# Patient Record
Sex: FEMALE | Race: BLACK OR AFRICAN AMERICAN | NOT HISPANIC OR LATINO | URBAN - METROPOLITAN AREA
[De-identification: names, ages, dates, MRNs, and addresses within clinical notes are randomized per-mention and may not be internally consistent; named-entity substitution may affect disease eponyms.]

---

## 2023-12-07 ENCOUNTER — EMERGENCY (EMERGENCY)
Facility: HOSPITAL | Age: 58
LOS: 1 days | Discharge: HOME | End: 2023-12-07
Attending: EMERGENCY MEDICINE | Admitting: EMERGENCY MEDICINE
Payer: COMMERCIAL

## 2023-12-07 VITALS
DIASTOLIC BLOOD PRESSURE: 100 MMHG | OXYGEN SATURATION: 100 % | TEMPERATURE: 98 F | RESPIRATION RATE: 18 BRPM | SYSTOLIC BLOOD PRESSURE: 156 MMHG | HEART RATE: 65 BPM

## 2023-12-07 VITALS
RESPIRATION RATE: 17 BRPM | HEART RATE: 77 BPM | DIASTOLIC BLOOD PRESSURE: 94 MMHG | TEMPERATURE: 98 F | OXYGEN SATURATION: 100 % | SYSTOLIC BLOOD PRESSURE: 165 MMHG

## 2023-12-07 PROCEDURE — 99284 EMERGENCY DEPT VISIT MOD MDM: CPT

## 2023-12-07 PROCEDURE — 93971 EXTREMITY STUDY: CPT | Mod: 26,LT

## 2023-12-07 RX ORDER — RIVAROXABAN 15 MG-20MG
1 KIT ORAL
Qty: 42 | Refills: 0
Start: 2023-12-07 | End: 2023-12-27

## 2023-12-07 RX ORDER — IBUPROFEN 200 MG
600 TABLET ORAL ONCE
Refills: 0 | Status: COMPLETED | OUTPATIENT
Start: 2023-12-07 | End: 2023-12-07

## 2023-12-07 RX ORDER — RIVAROXABAN 15 MG-20MG
15 KIT ORAL ONCE
Refills: 0 | Status: COMPLETED | OUTPATIENT
Start: 2023-12-07 | End: 2023-12-07

## 2023-12-07 RX ADMIN — Medication 600 MILLIGRAM(S): at 12:52

## 2023-12-07 RX ADMIN — Medication 600 MILLIGRAM(S): at 12:22

## 2023-12-07 RX ADMIN — RIVAROXABAN 15 MILLIGRAM(S): KIT at 15:01

## 2023-12-07 NOTE — CHART NOTE - NSCHARTNOTEFT_GEN_A_CORE
KAMERON alerted by MD Parker that pt is ready for DC back to her shelter. Pt's medications will be ready at 4pm therefore provider requesting 4:30pm pickup. KAMERON met with pt who was unable to recall name of shelter, however, confirmed address on facesheet.     KAMERON arranged trip through [employer] portal (Inv# [id number]). Trip assigned to [employer] (p:[phone]) for 4:30pm pickup. SW confirmed pt's contact information.     No other SW needs identified at this time. KAMERON alerted by MD Parker that pt is ready for DC back to her shelter. Pt's medications will be ready at 4pm therefore provider requesting 4:30pm pickup. KAMERON met with pt who was unable to recall name of shelter, however, confirmed address on facesheet.     KAMERON arranged trip through [employer] portal (Inv# [id number]). Trip assigned to [employer] (p:[phone]) for 4:30pm pickup. SW confirmed pt's contact information.     No other SW needs identified at this time.

## 2023-12-07 NOTE — ED PROVIDER NOTE - NSFOLLOWUPINSTRUCTIONS_ED_ALL_ED_FT
YOU WERE SEEN IN THE ER FOR LEG PAIN AND YOU WERE FOUND TO HAVE A BLOOD CLOT IN YOUR LEG.    A PRESCRIPTION FOR BLOOD THINNER WAS SENT TO THE PHARMACY ([facility]).  THIS PRESCRIPTION IS FOR THE FIRST 21 DAYS OF TREATMENT, BUT IT IS EXTREMELY IMPORTANT THAT YOU SEE YOUR PRIMARY DOCTOR BEFORE 21 DAYS IS UP, BECAUSE YOU WILL NEED FURTHER PRESCRIPTIONS FOR THIS BLOOD THINNER.  YOU WILL NEED TO TAKE THIS MEDICATION FOR 3 MONTHS TOTAL, AND IT IS IMPORTANT THAT YOU NOT SKIP ANY DAYS.    PLEASE START TO TAKE YOUR BLOOD PRESSURE MEDICATION AGAIN EVERY DAY.    PLEASE RETURN TO THE ER FOR ANY WORSENING SYMPTOMS OR CONCERNS, INCLUDING (BUT NOT LIMITED TO): CHEST PAIN, SHORTNESS OF BREATH, WEAKNESS, OR WORSENING PAIN/SWELLING IN YOUR LEG.    THANK YOU FOR COMING TO [facility]. YOU WERE SEEN IN THE ER FOR LEG PAIN AND YOU WERE FOUND TO HAVE A BLOOD CLOT IN YOUR LEG.    A PRESCRIPTION FOR BLOOD THINNER WAS SENT TO THE PHARMACY ([facility]).  THIS PRESCRIPTION IS FOR THE FIRST 21 DAYS OF TREATMENT, BUT IT IS EXTREMELY IMPORTANT THAT YOU SEE YOUR PRIMARY DOCTOR BEFORE 21 DAYS IS UP, BECAUSE YOU WILL NEED FURTHER PRESCRIPTIONS FOR THIS BLOOD THINNER.  YOU WILL NEED TO TAKE THIS MEDICATION FOR 3 MONTHS TOTAL, AND IT IS IMPORTANT THAT YOU NOT SKIP ANY DAYS.    PLEASE START TO TAKE YOUR BLOOD PRESSURE MEDICATION AGAIN EVERY DAY.    PLEASE RETURN TO THE ER FOR ANY WORSENING SYMPTOMS OR CONCERNS, INCLUDING (BUT NOT LIMITED TO): CHEST PAIN, SHORTNESS OF BREATH, WEAKNESS, OR WORSENING PAIN/SWELLING IN YOUR LEG.    THANK YOU FOR COMING TO [facility]. YOU WERE SEEN IN THE ER FOR LEG PAIN AND YOU WERE FOUND TO HAVE A BLOOD CLOT IN YOUR LEG.    A PRESCRIPTION FOR BLOOD THINNER WAS SENT TO THE PHARMACY ([facility]).  THIS PRESCRIPTION IS FOR THE FIRST 30 DAYS OF TREATMENT, BUT IT IS EXTREMELY IMPORTANT THAT YOU SEE YOUR PRIMARY DOCTOR BEFORE 30 DAYS IS UP, BECAUSE YOU WILL NEED FURTHER PRESCRIPTIONS FOR THIS BLOOD THINNER.  YOU WILL NEED TO TAKE THIS MEDICATION FOR 3 MONTHS TOTAL, AND IT IS IMPORTANT THAT YOU NOT SKIP ANY DAYS.    PLEASE START TO TAKE YOUR BLOOD PRESSURE MEDICATION AGAIN EVERY DAY.    PLEASE RETURN TO THE ER FOR ANY WORSENING SYMPTOMS OR CONCERNS, INCLUDING (BUT NOT LIMITED TO): CHEST PAIN, SHORTNESS OF BREATH, WEAKNESS, OR WORSENING PAIN/SWELLING IN YOUR LEG.    THANK YOU FOR COMING TO [facility]. YOU WERE SEEN IN THE ER FOR LEG PAIN AND YOU WERE FOUND TO HAVE A BLOOD CLOT IN YOUR LEG.    A PRESCRIPTION FOR BLOOD THINNER WAS SENT TO THE PHARMACY ([facility]).  THIS PRESCRIPTION IS FOR THE FIRST 30 DAYS OF TREATMENT, BUT IT IS EXTREMELY IMPORTANT THAT YOU SEE YOUR PRIMARY DOCTOR BEFORE 30 DAYS IS UP, BECAUSE YOU WILL NEED FURTHER PRESCRIPTIONS FOR THIS BLOOD THINNER.  YOU WILL NEED TO TAKE THIS MEDICATION FOR 3 MONTHS TOTAL, AND IT IS IMPORTANT THAT YOU NOT SKIP ANY DAYS.    PLEASE START TO TAKE YOUR BLOOD PRESSURE MEDICATION AGAIN EVERY DAY.    PLEASE RETURN TO THE ER FOR ANY WORSENING SYMPTOMS OR CONCERNS, INCLUDING (BUT NOT LIMITED TO): CHEST PAIN, SHORTNESS OF BREATH, WEAKNESS, OR WORSENING PAIN/SWELLING IN YOUR LEG.    THANK YOU FOR COMING TO [facility].

## 2023-12-07 NOTE — ED PROVIDER NOTE - PHYSICAL EXAMINATION
left LE: normal appearing overall, no obvious swelling compared to right; right knee normal appearing and nontender to palpation, no erythema/warmth, full ROM but with "clicking" noted on movement; distal LE with hyperpigmentation that is chronic, per pt, and no evidence of erythema/fluctuance; calf normal appearing and nontender to palpation    pt able to stand and walk

## 2023-12-07 NOTE — ED PROVIDER NOTE - PROGRESS NOTE DETAILS
Dr. Parker: ultrasound showing "Deep venous thrombosis within the left popliteal vein.  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  Pt states she has insurance that covers her prescriptions.  I will send first 21 days of treatment to [facility] and pt will have to follow up with her doctor for remainder of treatment.  I discussed this with pt and importance of follow up before Rx ends, she is aware.  No labs were done at pt's ED visit today, including no coags, but pt is without known bleeding disorder or concern for active bleeding that warrants checking PT/INR/PTT at this time before starting Xarelto. Dr. Parker: ultrasound showing "Deep venous thrombosis within the left popliteal vein.  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  Pt states she has insurance that covers her prescriptions.  I will send first 21 days of treatment to [facility] and pt will have to follow up with her doctor for remainder of treatment.  I discussed this with pt and importance of follow up before Rx ends, she is aware.  No labs were done at pt's ED visit today, including no coags, but pt is without known bleeding disorder or concern for active bleeding that warrants checking PT/INR/PTT at this time before starting Xarelto.  First dose of Xarelto was given in ED, and pt has a doctor she will call to schedule follow up. Dr. Parker: ultrasound showing "Deep venous thrombosis within the left popliteal vein.  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  Pt states she has insurance that covers her prescriptions.  I will send Xarelto starter pack (30 days) to [facility] and pt will have to follow up with her doctor for remainder of treatment.  I discussed this with pt and importance of follow up before Rx ends, she is aware.  No labs were done at pt's ED visit today, including no coags, but pt is without known bleeding disorder or concern for active bleeding that warrants checking PT/INR/PTT at this time before starting Xarelto.  First dose of Xarelto was given in ED, and pt has a doctor she will call to schedule follow up.  I spoke to [facility] and starter pack will be free with coupon they have. Dr. Parker: ultrasound showing "Deep venous thrombosis within the left popliteal vein.  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  Pt states she has insurance that covers her prescriptions.  I will send Xarelto starter pack (30 days) to [facility] and pt will have to follow up with her doctor for remainder of treatment.  I discussed this with pt and importance of follow up before Rx ends, she is aware.  No labs were done at pt's ED visit today, including no coags, but pt is without known bleeding disorder or concern for active bleeding that warrants checking PT/INR/PTT/Hgb/Hct at this time before starting Xarelto.  First dose of Xarelto was given in ED, and pt has a doctor she will call to schedule follow up.  I spoke to [facility] and starter pack will be free with coupon they have.

## 2023-12-07 NOTE — ED ADULT NURSE NOTE - CHIEF COMPLAINT QUOTE
Pt brought in by EMS from [facility] for LLE pain and swelling. pt denies chest pain, sob, n/v/d, fever or chills. Pt brought in by EMS from [facility] for LLE pain and swelling. pt denies chest pain, sob, n/v/d, fever or chills.

## 2023-12-07 NOTE — ED PROVIDER NOTE - DIFFERENTIAL DIAGNOSIS
Differential Diagnosis osteoarthritis, musculoskeletal pain    unlikely DVT  not consistent with cellulitis    Pt with known HTN and not taking her BP medication as directed.  Discussed importance of every day taking her medication, and she states she will do this. osteoarthritis, musculoskeletal pain    low suspicion for DVT  not consistent with cellulitis    Pt with known HTN and not taking her BP medication as directed.  Discussed importance of every day taking her medication, and she states she will do this.

## 2023-12-07 NOTE — ED PROVIDER NOTE - CLINICAL SUMMARY MEDICAL DECISION MAKING FREE TEXT BOX
58-year-old female in the emergency department with pain that appears likely to be musculoskeletal, consider osteoarthritis; will evaluate for DVT with duplex, treat with NSAID, reevaluate; low utility for x-ray in this case as there is essentially no concern for fracture; no concern for abscess that would warrant further workup for that; no evidence of systemic illness that would warrant blood work

## 2023-12-07 NOTE — ED ADULT NURSE NOTE - NSFALLHARMRISKINTERV_ED_ALL_ED
Communicate risk of Fall with Harm to all staff, patient, and family/Provide visual cue: red socks, yellow wristband, yellow gown, etc/Reinforce activity limits and safety measures with patient and family/Bed in lowest position, wheels locked, appropriate side rails in place/Call bell, personal items and telephone in reach/Instruct patient to call for assistance before getting out of bed/chair/stretcher/Non-slip footwear applied when patient is off stretcher/[place] to call system/Physically safe environment - no spills, clutter or unnecessary equipment/Purposeful Proactive Rounding/Room/bathroom lighting operational, light cord in reach Communicate risk of Fall with Harm to all staff, patient, and family/Provide visual cue: red socks, yellow wristband, yellow gown, etc/Reinforce activity limits and safety measures with patient and family/Bed in lowest position, wheels locked, appropriate side rails in place/Call bell, personal items and telephone in reach/Instruct patient to call for assistance before getting out of bed/chair/stretcher/Non-slip footwear applied when patient is off stretcher/[place] to call system/Physically safe environment - no spills, clutter or unnecessary equipment/Purposeful Proactive Rounding/Room/bathroom lighting operational, light cord in reach

## 2023-12-07 NOTE — ED ADULT TRIAGE NOTE - CHIEF COMPLAINT QUOTE
Pt brought in by EMS from [facility] for LLE pain and swelling. pt denies chest pain, sob, n/v/d, fever or chills. Pt brought in by EMS from [facility] for LLE pain and swelling. pt denies chest pain, sob, n/v/d, fever or chills.

## 2023-12-07 NOTE — ED ADULT NURSE NOTE - OBJECTIVE STATEMENT
Received patient in Intake 10B c/o Left lower extremity swelling and pain. Patient denies SOB, chest pain, fever. Patient is A&OX4, airway patent, breathing unlabored and even. Medication given as ordered, awaiting US. Side rails up and safety maintained. Fall precaution in place. Call bells within reach.

## 2023-12-07 NOTE — ED PROVIDER NOTE - OBJECTIVE STATEMENT
Dr. Parker: This is a 58-year-old female with hypertension (on unknown oral medication that she has sufficient supply of, but has not taken in 1 week due to not always feeling like she needs to take the medication), s/p left shin abscess drainage earlier this year, in the emergency department with pain in her left lower extremity.  Patient states that she has had pain in the left leg, mostly in the left knee and somewhat in the left calf, only since last night, but she thinks it is related to a significant amount of walking that she did 5 days ago.    She endorses and demonstrated clicking in the left knee when moving it.  She denies falls or other injuries.  She denies chest pain, shortness of breath, nausea, vomiting, diarrhea.  Patient took naproxen last night with some relief of symptoms. Dr. Parker: This is a 58-year-old female with hypertension (on unknown oral medication that she has sufficient supply of, but has not taken in 1 week due to not always feeling like she needs to take the medication), s/p left shin abscess drainage earlier this year, in the emergency department with pain in her left lower extremity.  Patient states that she has had pain in the left leg, mostly in the left knee and somewhat in the left calf, only since last night, but she thinks it is related to a significant amount of walking that she did 5 days ago.    She endorses and demonstrated clicking in the left knee when moving it.  She denies falls or other injuries.  She denies chest pain, shortness of breath, nausea, vomiting, diarrhea.  No numbness, tingling or focal weakness.  Patient took naproxen last night with some relief of symptoms.

## 2023-12-07 NOTE — ED PROVIDER NOTE - PATIENT PORTAL LINK FT
You can access the FollowMyHealth Patient Portal offered by [facility] by registering at the following website: [email or web address]. By joining [facility]’s FollowMyHealth portal, you will also be able to view your health information using other applications (apps) compatible with our system. You can access the FollowMyHealth Patient Portal offered by [facility] by registering at the following website: [email or web address]. By joining [employer] [employer]’s FollowMyHealth portal, you will also be able to view your health information using other applications (apps) compatible with our system.